# Patient Record
Sex: FEMALE | Race: WHITE | NOT HISPANIC OR LATINO | Employment: FULL TIME | URBAN - METROPOLITAN AREA
[De-identification: names, ages, dates, MRNs, and addresses within clinical notes are randomized per-mention and may not be internally consistent; named-entity substitution may affect disease eponyms.]

---

## 2023-05-30 ENCOUNTER — OFFICE VISIT (OUTPATIENT)
Dept: PRIMARY CARE | Facility: CLINIC | Age: 48
End: 2023-05-30
Payer: COMMERCIAL

## 2023-05-30 ENCOUNTER — LAB (OUTPATIENT)
Dept: LAB | Facility: LAB | Age: 48
End: 2023-05-30
Payer: COMMERCIAL

## 2023-05-30 VITALS
SYSTOLIC BLOOD PRESSURE: 122 MMHG | BODY MASS INDEX: 26.84 KG/M2 | WEIGHT: 171 LBS | HEIGHT: 67 IN | TEMPERATURE: 97.9 F | DIASTOLIC BLOOD PRESSURE: 72 MMHG

## 2023-05-30 DIAGNOSIS — Z00.00 HEALTHCARE MAINTENANCE: Primary | ICD-10-CM

## 2023-05-30 DIAGNOSIS — Z00.00 HEALTHCARE MAINTENANCE: ICD-10-CM

## 2023-05-30 DIAGNOSIS — Z13.29 SCREENING FOR THYROID DISORDER: ICD-10-CM

## 2023-05-30 DIAGNOSIS — Z13.21 ENCOUNTER FOR VITAMIN DEFICIENCY SCREENING: ICD-10-CM

## 2023-05-30 PROBLEM — R92.0 ABNORMAL MAMMOGRAM WITH MICROCALCIFICATION: Status: ACTIVE | Noted: 2018-07-24

## 2023-05-30 PROBLEM — N60.12 FIBROCYSTIC CHANGES OF LEFT BREAST: Status: ACTIVE | Noted: 2018-08-02

## 2023-05-30 PROBLEM — G89.29 CHRONIC HEADACHES: Status: ACTIVE | Noted: 2023-05-30

## 2023-05-30 PROBLEM — R51.9 CHRONIC HEADACHES: Status: ACTIVE | Noted: 2023-05-30

## 2023-05-30 LAB
ALANINE AMINOTRANSFERASE (SGPT) (U/L) IN SER/PLAS: 13 U/L (ref 7–45)
ALBUMIN (G/DL) IN SER/PLAS: 4.2 G/DL (ref 3.4–5)
ALKALINE PHOSPHATASE (U/L) IN SER/PLAS: 33 U/L (ref 33–110)
ANION GAP IN SER/PLAS: 8 MMOL/L (ref 10–20)
ASPARTATE AMINOTRANSFERASE (SGOT) (U/L) IN SER/PLAS: 17 U/L (ref 9–39)
BASOPHILS (10*3/UL) IN BLOOD BY AUTOMATED COUNT: 0.02 X10E9/L (ref 0–0.1)
BASOPHILS/100 LEUKOCYTES IN BLOOD BY AUTOMATED COUNT: 0.4 % (ref 0–2)
BILIRUBIN TOTAL (MG/DL) IN SER/PLAS: 0.4 MG/DL (ref 0–1.2)
CALCIDIOL (25 OH VITAMIN D3) (NG/ML) IN SER/PLAS: 42 NG/ML
CALCIUM (MG/DL) IN SER/PLAS: 9.4 MG/DL (ref 8.6–10.3)
CARBON DIOXIDE, TOTAL (MMOL/L) IN SER/PLAS: 32 MMOL/L (ref 21–32)
CHLORIDE (MMOL/L) IN SER/PLAS: 105 MMOL/L (ref 98–107)
CHOLESTEROL (MG/DL) IN SER/PLAS: 252 MG/DL (ref 0–199)
CHOLESTEROL IN HDL (MG/DL) IN SER/PLAS: 67.4 MG/DL
CHOLESTEROL/HDL RATIO: 3.7
CREATININE (MG/DL) IN SER/PLAS: 0.76 MG/DL (ref 0.5–1.05)
EOSINOPHILS (10*3/UL) IN BLOOD BY AUTOMATED COUNT: 0.07 X10E9/L (ref 0–0.7)
EOSINOPHILS/100 LEUKOCYTES IN BLOOD BY AUTOMATED COUNT: 1.3 % (ref 0–6)
ERYTHROCYTE DISTRIBUTION WIDTH (RATIO) BY AUTOMATED COUNT: 13.3 % (ref 11.5–14.5)
ERYTHROCYTE MEAN CORPUSCULAR HEMOGLOBIN CONCENTRATION (G/DL) BY AUTOMATED: 32.3 G/DL (ref 32–36)
ERYTHROCYTE MEAN CORPUSCULAR VOLUME (FL) BY AUTOMATED COUNT: 96 FL (ref 80–100)
ERYTHROCYTES (10*6/UL) IN BLOOD BY AUTOMATED COUNT: 4.27 X10E12/L (ref 4–5.2)
GFR FEMALE: >90 ML/MIN/1.73M2
GLUCOSE (MG/DL) IN SER/PLAS: 75 MG/DL (ref 74–99)
HEMATOCRIT (%) IN BLOOD BY AUTOMATED COUNT: 41.2 % (ref 36–46)
HEMOGLOBIN (G/DL) IN BLOOD: 13.3 G/DL (ref 12–16)
IMMATURE GRANULOCYTES/100 LEUKOCYTES IN BLOOD BY AUTOMATED COUNT: 0.2 % (ref 0–0.9)
LDL: 157 MG/DL (ref 0–99)
LEUKOCYTES (10*3/UL) IN BLOOD BY AUTOMATED COUNT: 5.4 X10E9/L (ref 4.4–11.3)
LYMPHOCYTES (10*3/UL) IN BLOOD BY AUTOMATED COUNT: 1.39 X10E9/L (ref 1.2–4.8)
LYMPHOCYTES/100 LEUKOCYTES IN BLOOD BY AUTOMATED COUNT: 25.8 % (ref 13–44)
MONOCYTES (10*3/UL) IN BLOOD BY AUTOMATED COUNT: 0.35 X10E9/L (ref 0.1–1)
MONOCYTES/100 LEUKOCYTES IN BLOOD BY AUTOMATED COUNT: 6.5 % (ref 2–10)
NEUTROPHILS (10*3/UL) IN BLOOD BY AUTOMATED COUNT: 3.55 X10E9/L (ref 1.2–7.7)
NEUTROPHILS/100 LEUKOCYTES IN BLOOD BY AUTOMATED COUNT: 65.8 % (ref 40–80)
PLATELETS (10*3/UL) IN BLOOD AUTOMATED COUNT: 186 X10E9/L (ref 150–450)
POTASSIUM (MMOL/L) IN SER/PLAS: 4.6 MMOL/L (ref 3.5–5.3)
PROTEIN TOTAL: 6.3 G/DL (ref 6.4–8.2)
SODIUM (MMOL/L) IN SER/PLAS: 140 MMOL/L (ref 136–145)
THYROTROPIN (MIU/L) IN SER/PLAS BY DETECTION LIMIT <= 0.05 MIU/L: 1.77 MIU/L (ref 0.44–3.98)
TRIGLYCERIDE (MG/DL) IN SER/PLAS: 138 MG/DL (ref 0–149)
UREA NITROGEN (MG/DL) IN SER/PLAS: 14 MG/DL (ref 6–23)
VLDL: 28 MG/DL (ref 0–40)

## 2023-05-30 PROCEDURE — 80061 LIPID PANEL: CPT

## 2023-05-30 PROCEDURE — 84443 ASSAY THYROID STIM HORMONE: CPT

## 2023-05-30 PROCEDURE — 36415 COLL VENOUS BLD VENIPUNCTURE: CPT

## 2023-05-30 PROCEDURE — 80053 COMPREHEN METABOLIC PANEL: CPT

## 2023-05-30 PROCEDURE — 99396 PREV VISIT EST AGE 40-64: CPT | Performed by: NURSE PRACTITIONER

## 2023-05-30 PROCEDURE — 1036F TOBACCO NON-USER: CPT | Performed by: NURSE PRACTITIONER

## 2023-05-30 PROCEDURE — 85025 COMPLETE CBC W/AUTO DIFF WBC: CPT

## 2023-05-30 PROCEDURE — 82306 VITAMIN D 25 HYDROXY: CPT

## 2023-05-30 RX ORDER — CHOLECALCIFEROL (VITAMIN D3) 25 MCG
1 TABLET ORAL DAILY
COMMUNITY
Start: 2022-05-13

## 2023-05-30 RX ORDER — MULTIVIT-MIN/IRON FUM/FOLIC AC 7.5 MG-4
1 TABLET ORAL DAILY
COMMUNITY

## 2023-05-30 RX ORDER — MULTIVIT-MIN/IRON/FOLIC ACID/K 18-600-40
CAPSULE ORAL
COMMUNITY

## 2023-05-30 RX ORDER — FLUTICASONE PROPIONATE 50 MCG
SPRAY, SUSPENSION (ML) NASAL
COMMUNITY
Start: 2016-01-20

## 2023-05-30 RX ORDER — OMEGA-3 FATTY ACIDS/FISH OIL 340-1000MG
CAPSULE ORAL
COMMUNITY
Start: 2022-05-13

## 2023-05-30 RX ORDER — ASCORBIC ACID/MULTIVIT-MIN 1000 MG
EFFERVESCENT POWDER IN PACKET ORAL
COMMUNITY

## 2023-05-30 RX ORDER — CETIRIZINE HYDROCHLORIDE 10 MG/1
1 TABLET ORAL DAILY
COMMUNITY

## 2023-05-30 NOTE — PATIENT INSTRUCTIONS
Good to see you today.  Your exam looks good.  Fasting labs and I will follow up via My Chart.  Let me know if you need anything.

## 2023-05-30 NOTE — PROGRESS NOTES
"Subjective   Patient ID: Erinn Bolanos is a 48 y.o. female who presents for Annual Exam (Patient has GYN-Hina Contreras).    HPI   GYN:  Ben  - Normal PAP 12/22  Just had Mammogram 4/23  Colonoscopy last November  Spouse had COVID but she did not.  Sees Dr. Lake for DERM  Only concern is more Acid Reflux in the last couple weeks.    Has also had allergy symptoms.    Better with Nexium.    Review of Systems   All other systems reviewed and are negative.      Objective   /72 (BP Location: Left arm, Patient Position: Sitting)   Temp 36.6 °C (97.9 °F) (Temporal)   Ht 1.702 m (5' 7\")   Wt 77.6 kg (171 lb)   BMI 26.78 kg/m²     Physical Exam  Vitals and nursing note reviewed.   Constitutional:       Appearance: Normal appearance.   HENT:      Head: Normocephalic and atraumatic.      Right Ear: Tympanic membrane, ear canal and external ear normal.      Left Ear: Tympanic membrane, ear canal and external ear normal.      Nose: Nose normal.      Mouth/Throat:      Comments: + PND  Neurological:      Mental Status: She is alert.         Assessment/Plan   Problem List Items Addressed This Visit       Encounter for vitamin deficiency screening - Primary    Relevant Orders    Vitamin D, Total (Completed)   Healthcare Maintenance - check labs   Screening for thyroid  Reviewed triggers for acid reflux  "

## 2024-07-18 ENCOUNTER — APPOINTMENT (OUTPATIENT)
Dept: PRIMARY CARE | Facility: CLINIC | Age: 49
End: 2024-07-18
Payer: COMMERCIAL

## 2024-07-18 ENCOUNTER — LAB (OUTPATIENT)
Dept: LAB | Facility: LAB | Age: 49
End: 2024-07-18
Payer: COMMERCIAL

## 2024-07-18 VITALS
DIASTOLIC BLOOD PRESSURE: 78 MMHG | HEIGHT: 68 IN | TEMPERATURE: 97.8 F | WEIGHT: 173 LBS | SYSTOLIC BLOOD PRESSURE: 120 MMHG | BODY MASS INDEX: 26.22 KG/M2

## 2024-07-18 DIAGNOSIS — Z13.1 SCREENING FOR DIABETES MELLITUS: ICD-10-CM

## 2024-07-18 DIAGNOSIS — Z13.21 ENCOUNTER FOR VITAMIN DEFICIENCY SCREENING: ICD-10-CM

## 2024-07-18 DIAGNOSIS — Z00.00 HEALTHCARE MAINTENANCE: ICD-10-CM

## 2024-07-18 DIAGNOSIS — Z13.29 THYROID DISORDER SCREENING: ICD-10-CM

## 2024-07-18 DIAGNOSIS — Z00.00 HEALTHCARE MAINTENANCE: Primary | ICD-10-CM

## 2024-07-18 DIAGNOSIS — R39.15 URINARY URGENCY: ICD-10-CM

## 2024-07-18 PROBLEM — R32 LACK OF BLADDER CONTROL: Status: ACTIVE | Noted: 2018-06-01

## 2024-07-18 LAB
25(OH)D3 SERPL-MCNC: 47 NG/ML (ref 30–100)
ALBUMIN SERPL BCP-MCNC: 4.6 G/DL (ref 3.4–5)
ALP SERPL-CCNC: 38 U/L (ref 33–110)
ALT SERPL W P-5'-P-CCNC: 11 U/L (ref 7–45)
ANION GAP SERPL CALC-SCNC: 11 MMOL/L (ref 10–20)
AST SERPL W P-5'-P-CCNC: 16 U/L (ref 9–39)
BILIRUB SERPL-MCNC: 0.5 MG/DL (ref 0–1.2)
BUN SERPL-MCNC: 18 MG/DL (ref 6–23)
CALCIUM SERPL-MCNC: 9.3 MG/DL (ref 8.6–10.3)
CHLORIDE SERPL-SCNC: 103 MMOL/L (ref 98–107)
CHOLEST SERPL-MCNC: 303 MG/DL (ref 0–199)
CHOLESTEROL/HDL RATIO: 3.3
CO2 SERPL-SCNC: 27 MMOL/L (ref 21–32)
CREAT SERPL-MCNC: 0.83 MG/DL (ref 0.5–1.05)
EGFRCR SERPLBLD CKD-EPI 2021: 87 ML/MIN/1.73M*2
ERYTHROCYTE [DISTWIDTH] IN BLOOD BY AUTOMATED COUNT: 14.3 % (ref 11.5–14.5)
GLUCOSE SERPL-MCNC: 92 MG/DL (ref 74–99)
HCT VFR BLD AUTO: 44.4 % (ref 36–46)
HDLC SERPL-MCNC: 90.9 MG/DL
HGB BLD-MCNC: 14.1 G/DL (ref 12–16)
LDLC SERPL CALC-MCNC: 198 MG/DL
MCH RBC QN AUTO: 30.1 PG (ref 26–34)
MCHC RBC AUTO-ENTMCNC: 31.8 G/DL (ref 32–36)
MCV RBC AUTO: 95 FL (ref 80–100)
NON HDL CHOLESTEROL: 212 MG/DL (ref 0–149)
NRBC BLD-RTO: 0 /100 WBCS (ref 0–0)
PLATELET # BLD AUTO: 223 X10*3/UL (ref 150–450)
POTASSIUM SERPL-SCNC: 5 MMOL/L (ref 3.5–5.3)
PROT SERPL-MCNC: 7.3 G/DL (ref 6.4–8.2)
RBC # BLD AUTO: 4.68 X10*6/UL (ref 4–5.2)
SODIUM SERPL-SCNC: 136 MMOL/L (ref 136–145)
TRIGL SERPL-MCNC: 73 MG/DL (ref 0–149)
TSH SERPL-ACNC: 2.39 MIU/L (ref 0.44–3.98)
VLDL: 15 MG/DL (ref 0–40)
WBC # BLD AUTO: 5.9 X10*3/UL (ref 4.4–11.3)

## 2024-07-18 PROCEDURE — 99396 PREV VISIT EST AGE 40-64: CPT | Performed by: NURSE PRACTITIONER

## 2024-07-18 PROCEDURE — 1036F TOBACCO NON-USER: CPT | Performed by: NURSE PRACTITIONER

## 2024-07-18 PROCEDURE — 85027 COMPLETE CBC AUTOMATED: CPT

## 2024-07-18 PROCEDURE — 81003 URINALYSIS AUTO W/O SCOPE: CPT

## 2024-07-18 PROCEDURE — 82306 VITAMIN D 25 HYDROXY: CPT

## 2024-07-18 PROCEDURE — 36415 COLL VENOUS BLD VENIPUNCTURE: CPT

## 2024-07-18 PROCEDURE — 3008F BODY MASS INDEX DOCD: CPT | Performed by: NURSE PRACTITIONER

## 2024-07-18 PROCEDURE — 80061 LIPID PANEL: CPT

## 2024-07-18 PROCEDURE — 80053 COMPREHEN METABOLIC PANEL: CPT

## 2024-07-18 PROCEDURE — 84443 ASSAY THYROID STIM HORMONE: CPT

## 2024-07-18 PROCEDURE — 83036 HEMOGLOBIN GLYCOSYLATED A1C: CPT

## 2024-07-18 ASSESSMENT — PROMIS GLOBAL HEALTH SCALE
RATE_SOCIAL_SATISFACTION: EXCELLENT
RATE_MENTAL_HEALTH: EXCELLENT
RATE_AVERAGE_FATIGUE: MILD
RATE_GENERAL_HEALTH: VERY GOOD
CARRYOUT_PHYSICAL_ACTIVITIES: COMPLETELY
EMOTIONAL_PROBLEMS: RARELY
RATE_AVERAGE_PAIN: 1
RATE_QUALITY_OF_LIFE: EXCELLENT
CARRYOUT_SOCIAL_ACTIVITIES: EXCELLENT
RATE_PHYSICAL_HEALTH: GOOD

## 2024-07-18 ASSESSMENT — PATIENT HEALTH QUESTIONNAIRE - PHQ9
2. FEELING DOWN, DEPRESSED OR HOPELESS: NOT AT ALL
SUM OF ALL RESPONSES TO PHQ9 QUESTIONS 1 AND 2: 0
1. LITTLE INTEREST OR PLEASURE IN DOING THINGS: NOT AT ALL

## 2024-07-18 NOTE — PATIENT INSTRUCTIONS
Good to see you today  Fasting labs and urinalysis today.  REFER to URO/GYN   you can schedule by [phone]  Keep up the good work taking care of yourself.

## 2024-07-18 NOTE — PROGRESS NOTES
"Subjective   Patient ID: Erinn Bolanos is a 49 y.o. female who presents for Annual Exam.    HPI   GYN  - Hina Contreras current  DERM - Frannychad DE SOUZA   Thinks that red wine & red sauce make it worse.  Only time she takes anything, when she is having something  2018  Has had some urinary concerns.  Seems to have to go a lot day and night.  Cannot seem to hold urine.  Regular with exercise.      Review of Systems   Genitourinary:  Positive for frequency and urgency. Negative for dysuria, enuresis and pelvic pain.       Objective   /78   Temp 36.6 °C (97.8 °F)   Ht 1.727 m (5' 8\")   Wt 78.5 kg (173 lb)   BMI 26.30 kg/m²     Physical Exam  Vitals and nursing note reviewed.   Constitutional:       Appearance: Normal appearance.   HENT:      Head: Normocephalic and atraumatic.      Right Ear: Tympanic membrane, ear canal and external ear normal.      Left Ear: Tympanic membrane, ear canal and external ear normal.      Nose: Nose normal.      Mouth/Throat:      Mouth: Mucous membranes are moist.      Pharynx: Oropharynx is clear.   Eyes:      Extraocular Movements: Extraocular movements intact.      Conjunctiva/sclera: Conjunctivae normal.      Pupils: Pupils are equal, round, and reactive to light.   Neck:      Thyroid: No thyroid mass, thyromegaly or thyroid tenderness.   Cardiovascular:      Rate and Rhythm: Normal rate and regular rhythm.      Pulses: Normal pulses.      Heart sounds: Normal heart sounds.   Pulmonary:      Effort: Pulmonary effort is normal.      Breath sounds: Normal breath sounds.   Abdominal:      General: Bowel sounds are normal.      Palpations: Abdomen is soft.   Genitourinary:     Comments: Deferred  Musculoskeletal:         General: Normal range of motion.      Cervical back: Normal range of motion and neck supple.   Skin:     General: Skin is warm.      Capillary Refill: Capillary refill takes 2 to 3 seconds.      Comments: Tan scattered nevi & freckles.   Neurological:      " Mental Status: She is alert and oriented to person, place, and time. Mental status is at baseline.   Psychiatric:         Mood and Affect: Mood normal.         Behavior: Behavior normal.         Thought Content: Thought content normal.         Judgment: Judgment normal.         Assessment/Plan   Problem List Items Addressed This Visit             ICD-10-CM    Healthcare maintenance - Primary Z00.00    Relevant Orders    CBC (Completed)    Comprehensive Metabolic Panel (Completed)    Lipid Panel (Completed)    Urinary urgency R39.15    Relevant Orders    Referral to Urogynecology    Urinalysis with Reflex Microscopic (Completed)

## 2024-07-19 LAB
APPEARANCE UR: ABNORMAL
BILIRUB UR STRIP.AUTO-MCNC: NEGATIVE MG/DL
COLOR UR: ABNORMAL
EST. AVERAGE GLUCOSE BLD GHB EST-MCNC: 111 MG/DL
GLUCOSE UR STRIP.AUTO-MCNC: NORMAL MG/DL
HBA1C MFR BLD: 5.5 %
KETONES UR STRIP.AUTO-MCNC: NEGATIVE MG/DL
LEUKOCYTE ESTERASE UR QL STRIP.AUTO: NEGATIVE
NITRITE UR QL STRIP.AUTO: NEGATIVE
PH UR STRIP.AUTO: 5.5 [PH]
PROT UR STRIP.AUTO-MCNC: NEGATIVE MG/DL
RBC # UR STRIP.AUTO: NEGATIVE /UL
SP GR UR STRIP.AUTO: 1.03
UROBILINOGEN UR STRIP.AUTO-MCNC: NORMAL MG/DL

## 2024-07-26 NOTE — PROGRESS NOTES
[facility]  Outpatient Clinic Note    Patient: Erinn Bolanos  Age/Sex: 49 y.o., female  MRN: [id number]  Referred by: Dr. Og     Chief Complaint:  Urinary urgency         History of Present Illness  This is a 49 y.o. female,  who presents as a new patient to the clinic for urinary urgency that has been going on for a few years. She has urinary urgency and frequency, she goes to the bathroom 3 time an hour. She gets up once a night to go the bathroom. She drinks a few cups of coffee a day and water. She admits to MARY but is more bothered by urge. She has not tried medication for any of these issues.  She denies dysuria, gross hematuria, flank pain, pelvic pain, vaginal bulging, fever or chills. The patient stated her bowel movements are regular and daily. She is sexually active, she admits to pain with thrusting. She denies vaginal dryness. She gets regular periods every month, she has heavy bleeding and cramping for the first few days.  She had 1 vaginal birth with tearing.  Denies abdominal/pelvic surgery. No history of breast cancer. She denies ever using tobacco.         Gyn History:  - Menopausal: No           Postmenopausal bleeding: No  - Hysterectomy: No  - Sexually active:  Yes  Dyspareunia: Yes   Other issues: none  - Number of prior vaginal deliveries: 1 with tearing  - Number of prior c-sections: 0      Past Medical & Surgical History  Past Medical History:   Diagnosis Date    Allergic     Eczema     Headache     Urinary tract infection      Past Surgical History:   Procedure Laterality Date    OTHER SURGICAL HISTORY  2022    Loop electrosurgical excision procedure    OTHER SURGICAL HISTORY Left 2022    Breast biopsy    SKIN BIOPSY      All benign       Family History  Family History   Problem Relation Name Age of Onset    No Known Problems Mother      Hyperlipidemia Father      No Known Problems Sister Larisa     No Known Problems Sister Blaire     No Known Problems Brother Marcin      No Known Problems Daughter Silvia        Social History  She reports that she has never smoked. She has never been exposed to tobacco smoke. She has never used smokeless tobacco. She reports current alcohol use of about 5.0 standard drinks of alcohol per week. She reports that she does not use drugs.    Allergies  Patient has no known allergies.    Medications:  Current Outpatient Medications on File Prior to Visit   Medication Sig Dispense Refill    ascorbic acid, vitamin C, 500 mg capsule 1 cap(s) orally once a day      ascorbic acid-multivit-min (Emergen-C) 1,000 mg powder effervescent in packet Emergen-C oral powder for reconstitution   Quantity: 0   Refills: 0   Ordered: 21-Jul-2022  Edelmira Gutiérrez Generic Substitution Allowed      cetirizine (ZyrTEC) 10 mg tablet Take 1 tablet (10 mg) by mouth once daily.      cholecalciferol (Vitamin D-3) 25 MCG (1000 UT) tablet Take 1 tablet (25 mcg) by mouth once daily.      fluticasone (Flonase) 50 mcg/actuation nasal spray 2 spray(s) nasal 2 times a day      multivitamin with minerals (multivit-min-iron fum-folic ac) tablet Take 1 tablet by mouth once daily.      omega-3 fatty acids-fish oil (Fish OiL) 340-1,000 mg capsule 1 cap(s) orally once a day       No current facility-administered medications on file prior to visit.      Vitals:    07/30/24 1018   BP: 125/78   Pulse: 80     Body mass index is 25.39 kg/m².    Review of Systems   A comprehensive 10+ review of systems was negative except for: see hpi          Physical Exam                                                                                                                      General: Well developed, well nourished, alert and cooperative, appears in no acute distress  Head: Normocephalic, atraumatic  Neck: supple, trachea midline  Eyes: Non-injected conjunctiva, sclera clear, no proptosis  Cardiac: Extremities are warm and well perfused. No edema, cyanosis or pallor.   Lungs: Breathing is easy,  non-labored. Speaking in clear and complete sentences. Normal diaphragmatic movement.  Abdomen: soft, non-distended, non-tender, no rebound or guarding, no hernia and no CVA tenderness   MSK: Ambulatory with steady gait, unassisted  Neuro: alert and oriented to person, place and time  Psych: Demonstrates good judgement and reason, without hallucinations, abnormal affect or abnormal behaviors.  Skin: no obvious lesions, no rashes  Pelvic:  Genitourinary:  normal external genitalia, Bartholin's glands negative, [place]'s glands negative  Urethra   normal meatus, non-tender, no periurethral mass  Vaginal mucosa  normal  Cervix normal  Uterus normal size, nontender  Adnexae  negative nontender, no masses  Atrophy negative    CST negative    POP-stage 3    Rectal: deferred    PVR (by Ultrasound): 192mL   Urine dip:   Recent Results (from the past 6 hour(s))   POCT UA Automated manually resulted    Collection Time: 07/30/24 10:33 AM   Result Value Ref Range    POC Color, Urine Yellow Straw, Yellow, Light-Yellow    POC Appearance, Urine Clear Clear    POC Glucose, Urine NEGATIVE NEGATIVE mg/dl    POC Bilirubin, Urine NEGATIVE NEGATIVE    POC Ketones, Urine NEGATIVE NEGATIVE mg/dl    POC Specific Gravity, Urine 1.010 1.005 - 1.035    POC Blood, Urine NEGATIVE NEGATIVE    POC PH, Urine 6.0 No Reference Range Established PH    POC Protein, Urine NEGATIVE NEGATIVE, 30 (1+) mg/dl    POC Urobilinogen, Urine 0.2 0.2, 1.0 EU/DL    Poc Nitrite, Urine NEGATIVE NEGATIVE    POC Leukocytes, Urine NEGATIVE NEGATIVE       Labs  N/A    Imaging  N/A      IMPRESSION AND PLAN:  Erinn Bolanos is a 49 y.o. presents with mixed urinary incontinence, overactive bladder and prolapse.      NEMESIO: Urge dominant  -discussed mechanism of UUI and MARY, and treatment options for both including PFT, pessary, sling for MARY and PFT, pharmacotherapy and third-line therapy for OAB  -Educational handouts given on treatment options    OAB/UUI  -Start Vesicare daily  in the morning  -we discussed botox vs sacral neuromodulation: both have similar efficacy 80% patients reports >50% improvement, botox associated with 5% risk of incomplete emptying, increase in UTI and will require re-injection in 6-9 months; and as early as 3 months. SNM is a staged procedure, 2 weeks apart, consisting first of lead implantation then internalization of IPG if there is improvement. Interstim is associated with lead migration, explantation, infection and bleeding, though risks are all <5%. We also discussed PTNS which is associated with success rates comparable to medical therapy but without side-effects without significant major morbidity.     POP:  -Fitted with a pessary ring #3 with support 2.50 in (Rs3)  -We discussed surgical options, patient would like to possibly discuss at next visit  -Educational handout given on vaginal prolapse, and pessary education    Pessary   -A pessary is a round device inserted into the vagina to support fallen or prolapsed uterus, bladder or rectum. ?It may ease your feelings of vaginal pressure or bulging. It can also be used to alleviate urinary incontinence by repositioning the problem area back to its original position. It can be a relatively simple solution to an uncomfortable problem.   -There are many different types of pessaries and each is made in many sizes. ?We have worked with you today to find the correct pessary for you. ?It is not uncommon that fitting will take a few tries.      What is it made of?   -Pessaries are made of soft, pliable medical grade silicon. This ensures a longer usage life and less chance of an allergic reaction.      How is it inserted?   -A pessary is inserted with a small amount of lubricant much like a diaphragm. ?A properly fitted pessary should not be felt once it is place.      How do I take care of it?    -You can be taught how to remove your pessary if you wish and are physically able to do so. ?If not, you can come to  the clinic every three months and we will remove it for you, clean it and reinsert it.   -A pessary can be removed as often as you wish or at least once every two weeks. ?Most women will notice increased vaginal discharge when leaving it in the vagina for a prolonged period of time. ?This is a normal response and unless the discharge becomes irritating or foul smelling, it is not a sign of infection. ?You may also notice the pessary will change color over time and this also is normal.    -Tell your physician if you are sexually active and your provider will fit you with the appropriate type if you choose to leave the pessary in the vagina during intercourse. ?   -At home, when you remove your pessary, it should be washed with mild soap and water and stored to be kept clean until you reinsert it. ?Keep pessary out overnight and replace in the morning.??The vagina is not sterile, so soap and water are appropriate for cleaning. ?Alcohol, bleach, peroxide, or other harsh chemicals should not be used as they can irritate the vagina and cause changes in the pessary rubber. ?     Will it fall out?   -The pessary may fall out with straining or heavy lifting. ?If this happens, notify your physician. ?It may be that your pessary is too small or you need have a change in size if it has been some time. You can also support the pessary vaginally during a bowel movement to prevent it from coming out. If your pessary falls out, wash it with soap and water before reinserting. ?       Patient follow up:   -Report inability to urinate or have bowel movement.   -Report any pain in urination or bowel movements.   -Report any signs of vaginal bleeding.   -Report and discharge with strong odor.   -Report any discomfort or pain while pessary is in place     Follow-up with me in 3 months or sooner if needed    All questions and concerns were answered and addressed.  The patient expressed understanding and agrees with the plan.     Reviewed  and approved by FRANCE MORRISSEY on 7/30/24 at 11:40 AM.

## 2024-07-30 ENCOUNTER — APPOINTMENT (OUTPATIENT)
Dept: UROLOGY | Facility: CLINIC | Age: 49
End: 2024-07-30
Payer: COMMERCIAL

## 2024-07-30 VITALS
SYSTOLIC BLOOD PRESSURE: 125 MMHG | DIASTOLIC BLOOD PRESSURE: 78 MMHG | WEIGHT: 167 LBS | BODY MASS INDEX: 25.31 KG/M2 | HEIGHT: 68 IN | HEART RATE: 80 BPM

## 2024-07-30 DIAGNOSIS — N81.10 VAGINAL PROLAPSE: ICD-10-CM

## 2024-07-30 DIAGNOSIS — N32.81 OAB (OVERACTIVE BLADDER): ICD-10-CM

## 2024-07-30 DIAGNOSIS — N39.46 MIXED STRESS AND URGE URINARY INCONTINENCE: Primary | ICD-10-CM

## 2024-07-30 LAB
POC APPEARANCE, URINE: CLEAR
POC BILIRUBIN, URINE: NEGATIVE
POC BLOOD, URINE: NEGATIVE
POC COLOR, URINE: YELLOW
POC GLUCOSE, URINE: NEGATIVE MG/DL
POC KETONES, URINE: NEGATIVE MG/DL
POC LEUKOCYTES, URINE: NEGATIVE
POC NITRITE,URINE: NEGATIVE
POC PH, URINE: 6 PH
POC PROTEIN, URINE: NEGATIVE MG/DL
POC SPECIFIC GRAVITY, URINE: 1.01
POC UROBILINOGEN, URINE: 0.2 EU/DL

## 2024-07-30 PROCEDURE — 2000F BLOOD PRESSURE MEASURE: CPT

## 2024-07-30 PROCEDURE — 99204 OFFICE O/P NEW MOD 45 MIN: CPT

## 2024-07-30 PROCEDURE — G2211 COMPLEX E/M VISIT ADD ON: HCPCS

## 2024-07-30 PROCEDURE — 1036F TOBACCO NON-USER: CPT

## 2024-07-30 PROCEDURE — 57160 INSERT PESSARY/OTHER DEVICE: CPT

## 2024-07-30 PROCEDURE — 3008F BODY MASS INDEX DOCD: CPT

## 2024-07-30 PROCEDURE — 51798 US URINE CAPACITY MEASURE: CPT

## 2024-07-30 PROCEDURE — 81003 URINALYSIS AUTO W/O SCOPE: CPT

## 2024-07-30 RX ORDER — SOLIFENACIN SUCCINATE 5 MG/1
5 TABLET, FILM COATED ORAL DAILY
Qty: 30 TABLET | Refills: 2 | Status: SHIPPED | OUTPATIENT
Start: 2024-07-30 | End: 2024-10-28

## 2024-07-30 ASSESSMENT — PAIN SCALES - GENERAL: PAINLEVEL: 0-NO PAIN

## 2024-07-30 NOTE — LETTER
2024     PARKER Henry  [address]  [facility], [address]  [facility] [address]    Patient: Erinn Bolanos   YOB: 1975   Date of Visit: 2024       Dear PARKER Henry:    Thank you for referring Erinn Bolanos to me for evaluation. Below are my notes for this consultation.  If you have questions, please do not hesitate to call me. I look forward to following your patient along with you.       Sincerely,     Ani Daly PA-C      CC: No Recipients  ______________________________________________________________________________________      [facility]  Outpatient Clinic Note    Patient: Erinn Bolanos  Age/Sex: 49 y.o., female  MRN: [id number]  Referred by: Dr. Og     Chief Complaint:  Urinary urgency         History of Present Illness  This is a 49 y.o. female,  who presents as a new patient to the clinic for urinary urgency that has been going on for a few years. She has urinary urgency and frequency, she goes to the bathroom 3 time an hour. She gets up once a night to go the bathroom. She drinks a few cups of coffee a day and water. She admits to MARY but is more bothered by urge. She has not tried medication for any of these issues.  She denies dysuria, gross hematuria, flank pain, pelvic pain, vaginal bulging, fever or chills. The patient stated her bowel movements are regular and daily. She is sexually active, she admits to pain with thrusting. She denies vaginal dryness. She gets regular periods every month, she has heavy bleeding and cramping for the first few days.  She had 1 vaginal birth with tearing.  Denies abdominal/pelvic surgery. No history of breast cancer. She denies ever using tobacco.         Gyn History:  - Menopausal: No           Postmenopausal bleeding: No  - Hysterectomy: No  - Sexually active:  Yes  Dyspareunia: Yes   Other issues: none  - Number of prior vaginal deliveries: 1 with tearing  - Number of prior c-sections:  0      Past Medical & Surgical History  Past Medical History:   Diagnosis Date   • Allergic    • Eczema    • Headache    • Urinary tract infection      Past Surgical History:   Procedure Laterality Date   • OTHER SURGICAL HISTORY  06/20/2022    Loop electrosurgical excision procedure   • OTHER SURGICAL HISTORY Left 06/20/2022    Breast biopsy   • SKIN BIOPSY      All benign       Family History  Family History   Problem Relation Name Age of Onset   • No Known Problems Mother     • Hyperlipidemia Father     • No Known Problems Sister Larisa    • No Known Problems Sister Blaire    • No Known Problems Brother Marcin    • No Known Problems Daughter Silvia        Social History  She reports that she has never smoked. She has never been exposed to tobacco smoke. She has never used smokeless tobacco. She reports current alcohol use of about 5.0 standard drinks of alcohol per week. She reports that she does not use drugs.    Allergies  Patient has no known allergies.    Medications:  Current Outpatient Medications on File Prior to Visit   Medication Sig Dispense Refill   • ascorbic acid, vitamin C, 500 mg capsule 1 cap(s) orally once a day     • ascorbic acid-multivit-min (Emergen-C) 1,000 mg powder effervescent in packet Emergen-C oral powder for reconstitution   Quantity: 0   Refills: 0   Ordered: 21-Jul-2022  Edelmira Gutiérrez Generic Substitution Allowed     • cetirizine (ZyrTEC) 10 mg tablet Take 1 tablet (10 mg) by mouth once daily.     • cholecalciferol (Vitamin D-3) 25 MCG (1000 UT) tablet Take 1 tablet (25 mcg) by mouth once daily.     • fluticasone (Flonase) 50 mcg/actuation nasal spray 2 spray(s) nasal 2 times a day     • multivitamin with minerals (multivit-min-iron fum-folic ac) tablet Take 1 tablet by mouth once daily.     • omega-3 fatty acids-fish oil (Fish OiL) 340-1,000 mg capsule 1 cap(s) orally once a day       No current facility-administered medications on file prior to visit.      Vitals:    07/30/24 1018   BP:  125/78   Pulse: 80     Body mass index is 25.39 kg/m².    Review of Systems   A comprehensive 10+ review of systems was negative except for: see hpi          Physical Exam                                                                                                                      General: Well developed, well nourished, alert and cooperative, appears in no acute distress  Head: Normocephalic, atraumatic  Neck: supple, trachea midline  Eyes: Non-injected conjunctiva, sclera clear, no proptosis  Cardiac: Extremities are warm and well perfused. No edema, cyanosis or pallor.   Lungs: Breathing is easy, non-labored. Speaking in clear and complete sentences. Normal diaphragmatic movement.  Abdomen: soft, non-distended, non-tender, no rebound or guarding, no hernia and no CVA tenderness   MSK: Ambulatory with steady gait, unassisted  Neuro: alert and oriented to person, place and time  Psych: Demonstrates good judgement and reason, without hallucinations, abnormal affect or abnormal behaviors.  Skin: no obvious lesions, no rashes  Pelvic:  Genitourinary:  normal external genitalia, Bartholin's glands negative, Convent's glands negative  Urethra   normal meatus, non-tender, no periurethral mass  Vaginal mucosa  normal  Cervix normal  Uterus normal size, nontender  Adnexae  negative nontender, no masses  Atrophy negative    CST negative    POP-stage 3    Rectal: deferred    PVR (by Ultrasound): 192mL   Urine dip:   Recent Results (from the past 6 hour(s))   POCT UA Automated manually resulted    Collection Time: 07/30/24 10:33 AM   Result Value Ref Range    POC Color, Urine Yellow Straw, Yellow, Light-Yellow    POC Appearance, Urine Clear Clear    POC Glucose, Urine NEGATIVE NEGATIVE mg/dl    POC Bilirubin, Urine NEGATIVE NEGATIVE    POC Ketones, Urine NEGATIVE NEGATIVE mg/dl    POC Specific Gravity, Urine 1.010 1.005 - 1.035    POC Blood, Urine NEGATIVE NEGATIVE    POC PH, Urine 6.0 No Reference Range Established PH     POC Protein, Urine NEGATIVE NEGATIVE, 30 (1+) mg/dl    POC Urobilinogen, Urine 0.2 0.2, 1.0 EU/DL    Poc Nitrite, Urine NEGATIVE NEGATIVE    POC Leukocytes, Urine NEGATIVE NEGATIVE       Labs  N/A    Imaging  N/A      IMPRESSION AND PLAN:  Erinn Bolanos is a 49 y.o. presents with mixed urinary incontinence, overactive bladder and prolapse.      NEMESIO: Urge dominant  -discussed mechanism of UUI and MARY, and treatment options for both including PFT, pessary, sling for MARY and PFT, pharmacotherapy and third-line therapy for OAB  -Educational handouts given on treatment options    OAB/UUI  -Start Vesicare daily in the morning  -we discussed botox vs sacral neuromodulation: both have similar efficacy 80% patients reports >50% improvement, botox associated with 5% risk of incomplete emptying, increase in UTI and will require re-injection in 6-9 months; and as early as 3 months. SNM is a staged procedure, 2 weeks apart, consisting first of lead implantation then internalization of IPG if there is improvement. Interstim is associated with lead migration, explantation, infection and bleeding, though risks are all <5%. We also discussed PTNS which is associated with success rates comparable to medical therapy but without side-effects without significant major morbidity.     POP:  -Fitted with a pessary ring #3 with support 2.50 in (Rs3)  -We discussed surgical options, patient would like to possibly discuss at next visit  -Educational handout given on vaginal prolapse, and pessary education    Pessary   -A pessary is a round device inserted into the vagina to support fallen or prolapsed uterus, bladder or rectum. ?It may ease your feelings of vaginal pressure or bulging. It can also be used to alleviate urinary incontinence by repositioning the problem area back to its original position. It can be a relatively simple solution to an uncomfortable problem.   -There are many different types of pessaries and each is made in many  sizes. ?We have worked with you today to find the correct pessary for you. ?It is not uncommon that fitting will take a few tries.      What is it made of?   -Pessaries are made of soft, pliable medical grade silicon. This ensures a longer usage life and less chance of an allergic reaction.      How is it inserted?   -A pessary is inserted with a small amount of lubricant much like a diaphragm. ?A properly fitted pessary should not be felt once it is place.      How do I take care of it?    -You can be taught how to remove your pessary if you wish and are physically able to do so. ?If not, you can come to the clinic every three months and we will remove it for you, clean it and reinsert it.   -A pessary can be removed as often as you wish or at least once every two weeks. ?Most women will notice increased vaginal discharge when leaving it in the vagina for a prolonged period of time. ?This is a normal response and unless the discharge becomes irritating or foul smelling, it is not a sign of infection. ?You may also notice the pessary will change color over time and this also is normal.    -Tell your physician if you are sexually active and your provider will fit you with the appropriate type if you choose to leave the pessary in the vagina during intercourse. ?   -At home, when you remove your pessary, it should be washed with mild soap and water and stored to be kept clean until you reinsert it. ?Keep pessary out overnight and replace in the morning.??The vagina is not sterile, so soap and water are appropriate for cleaning. ?Alcohol, bleach, peroxide, or other harsh chemicals should not be used as they can irritate the vagina and cause changes in the pessary rubber. ?     Will it fall out?   -The pessary may fall out with straining or heavy lifting. ?If this happens, notify your physician. ?It may be that your pessary is too small or you need have a change in size if it has been some time. You can also support the  pessary vaginally during a bowel movement to prevent it from coming out. If your pessary falls out, wash it with soap and water before reinserting. ?       Patient follow up:   -Report inability to urinate or have bowel movement.   -Report any pain in urination or bowel movements.   -Report any signs of vaginal bleeding.   -Report and discharge with strong odor.   -Report any discomfort or pain while pessary is in place     Follow-up with me in 3 months or sooner if needed    All questions and concerns were answered and addressed.  The patient expressed understanding and agrees with the plan.     Reviewed and approved by FRANCE MORRISSEY on 7/30/24 at 11:40 AM.

## 2024-07-31 PROBLEM — R39.15 URINARY URGENCY: Status: ACTIVE | Noted: 2024-07-31

## 2024-07-31 PROBLEM — Z13.1 SCREENING FOR DIABETES MELLITUS: Status: ACTIVE | Noted: 2023-05-30

## 2024-07-31 ASSESSMENT — ENCOUNTER SYMPTOMS
FREQUENCY: 1
DYSURIA: 0

## 2024-08-22 DIAGNOSIS — N39.46 MIXED STRESS AND URGE URINARY INCONTINENCE: ICD-10-CM

## 2024-08-22 RX ORDER — SOLIFENACIN SUCCINATE 5 MG/1
5 TABLET, FILM COATED ORAL DAILY
Qty: 90 TABLET | Refills: 0 | Status: SHIPPED | OUTPATIENT
Start: 2024-08-22 | End: 2024-11-20

## 2024-10-29 ENCOUNTER — APPOINTMENT (OUTPATIENT)
Dept: UROLOGY | Facility: CLINIC | Age: 49
End: 2024-10-29
Payer: COMMERCIAL

## 2024-10-29 DIAGNOSIS — N39.46 MIXED STRESS AND URGE URINARY INCONTINENCE: Primary | ICD-10-CM

## 2024-10-29 DIAGNOSIS — N32.81 OAB (OVERACTIVE BLADDER): ICD-10-CM

## 2024-10-29 DIAGNOSIS — N81.10 VAGINAL PROLAPSE: ICD-10-CM

## 2024-10-29 PROCEDURE — 1036F TOBACCO NON-USER: CPT

## 2024-10-29 PROCEDURE — 99214 OFFICE O/P EST MOD 30 MIN: CPT

## 2024-10-29 RX ORDER — MIRABEGRON 50 MG/1
50 TABLET, FILM COATED, EXTENDED RELEASE ORAL DAILY
Qty: 30 TABLET | Refills: 11 | Status: SHIPPED | OUTPATIENT
Start: 2024-10-29 | End: 2025-10-29

## 2025-01-29 ENCOUNTER — APPOINTMENT (OUTPATIENT)
Dept: UROLOGY | Facility: CLINIC | Age: 50
End: 2025-01-29
Payer: COMMERCIAL

## 2025-06-13 ENCOUNTER — APPOINTMENT (OUTPATIENT)
Dept: PRIMARY CARE | Facility: CLINIC | Age: 50
End: 2025-06-13
Payer: COMMERCIAL

## 2025-06-13 VITALS
BODY MASS INDEX: 23.79 KG/M2 | SYSTOLIC BLOOD PRESSURE: 100 MMHG | HEIGHT: 68 IN | DIASTOLIC BLOOD PRESSURE: 80 MMHG | TEMPERATURE: 97.7 F | WEIGHT: 157 LBS

## 2025-06-13 DIAGNOSIS — Z00.00 ROUTINE GENERAL MEDICAL EXAMINATION AT A HEALTH CARE FACILITY: Primary | ICD-10-CM

## 2025-06-13 DIAGNOSIS — Z13.1 SCREENING FOR DIABETES MELLITUS: ICD-10-CM

## 2025-06-13 DIAGNOSIS — Z13.228 SCREENING FOR METABOLIC DISORDER: ICD-10-CM

## 2025-06-13 DIAGNOSIS — E55.9 VITAMIN D DEFICIENCY: ICD-10-CM

## 2025-06-13 DIAGNOSIS — R39.15 URINARY URGENCY: ICD-10-CM

## 2025-06-13 DIAGNOSIS — Z13.6 SCREENING FOR HEART DISEASE: ICD-10-CM

## 2025-06-13 PROBLEM — J01.90 ACUTE BACTERIAL SINUSITIS: Status: ACTIVE | Noted: 2025-06-13

## 2025-06-13 PROBLEM — M25.579 ACUTE ANKLE PAIN: Status: ACTIVE | Noted: 2025-06-13

## 2025-06-13 PROBLEM — R53.83 MALAISE AND FATIGUE: Status: ACTIVE | Noted: 2025-06-13

## 2025-06-13 PROBLEM — R53.81 MALAISE AND FATIGUE: Status: ACTIVE | Noted: 2025-06-13

## 2025-06-13 PROBLEM — B96.89 ACUTE BACTERIAL SINUSITIS: Status: ACTIVE | Noted: 2025-06-13

## 2025-06-13 PROCEDURE — 99396 PREV VISIT EST AGE 40-64: CPT | Performed by: NURSE PRACTITIONER

## 2025-06-13 PROCEDURE — 3008F BODY MASS INDEX DOCD: CPT | Performed by: NURSE PRACTITIONER

## 2025-06-13 RX ORDER — MIRABEGRON 25 MG/1
25 TABLET, FILM COATED, EXTENDED RELEASE ORAL DAILY
Qty: 90 TABLET | Refills: 3 | Status: SHIPPED | OUTPATIENT
Start: 2025-06-13

## 2025-06-13 ASSESSMENT — PROMIS GLOBAL HEALTH SCALE
RATE_PHYSICAL_HEALTH: VERY GOOD
EMOTIONAL_PROBLEMS: NEVER
RATE_GENERAL_HEALTH: VERY GOOD
RATE_SOCIAL_SATISFACTION: EXCELLENT
CARRYOUT_PHYSICAL_ACTIVITIES: COMPLETELY
CARRYOUT_SOCIAL_ACTIVITIES: EXCELLENT
RATE_MENTAL_HEALTH: EXCELLENT
RATE_QUALITY_OF_LIFE: VERY GOOD
RATE_AVERAGE_PAIN: 1

## 2025-06-13 ASSESSMENT — PATIENT HEALTH QUESTIONNAIRE - PHQ9
SUM OF ALL RESPONSES TO PHQ9 QUESTIONS 1 AND 2: 0
1. LITTLE INTEREST OR PLEASURE IN DOING THINGS: NOT AT ALL
2. FEELING DOWN, DEPRESSED OR HOPELESS: NOT AT ALL

## 2025-06-13 NOTE — PROGRESS NOTES
Subjective   Patient ID: Erinn Bolanos is a 50 y.o. female who presents for Annual Exam (CPX).  HPI  Saw URO/GYN  - Ani Daly  Horrible constipation and bloating with Vesicare - switched Rx and Trial of Myrbetriq  in October January follow up Manny Wilson    Really started changing diet    Steel cut oats and cut out candy   Eating more protein  Walking on treadmill at least 3 days per week   Home weight - 152 #   Feeling good   Hoping that lipids have improved.  MAMMOGRAM - Ben 9/19/24 with follow up U/S  PAP 8/24 with Cervical Bx  UTD  Dental   Due for vision  Big family reunion this summer at the [place].    Review of Systems   Constitutional:  Positive for activity change and appetite change.   Respiratory:  Negative for shortness of breath.    Cardiovascular:  Negative for chest pain, palpitations and leg swelling.   Gastrointestinal:  Negative for abdominal pain.   Endocrine: Negative for cold intolerance, heat intolerance, polydipsia, polyphagia and polyuria.   Genitourinary:  Positive for frequency.       Objective   Physical Exam  Vitals and nursing note reviewed.   Constitutional:       Appearance: Normal appearance.   HENT:      Head: Normocephalic and atraumatic.      Right Ear: Tympanic membrane, ear canal and external ear normal.      Left Ear: Tympanic membrane, ear canal and external ear normal.      Nose: Nose normal.      Mouth/Throat:      Mouth: Mucous membranes are moist.      Pharynx: Oropharynx is clear.   Eyes:      Extraocular Movements: Extraocular movements intact.      Conjunctiva/sclera: Conjunctivae normal.      Pupils: Pupils are equal, round, and reactive to light.   Neck:      Thyroid: No thyroid mass, thyromegaly or thyroid tenderness.   Cardiovascular:      Rate and Rhythm: Normal rate and regular rhythm.      Pulses: Normal pulses.      Heart sounds: Normal heart sounds.   Pulmonary:      Effort: Pulmonary effort is normal.      Breath sounds: Normal breath sounds.    Abdominal:      General: Bowel sounds are normal.      Palpations: Abdomen is soft.   Genitourinary:     Comments: Deferred  Musculoskeletal:         General: Normal range of motion.      Cervical back: Normal range of motion and neck supple.   Skin:     General: Skin is warm.      Capillary Refill: Capillary refill takes 2 to 3 seconds.   Neurological:      Mental Status: She is alert and oriented to person, place, and time. Mental status is at baseline.   Psychiatric:         Mood and Affect: Mood normal.         Behavior: Behavior normal.         Thought Content: Thought content normal.         Judgment: Judgment normal.         Assessment & Plan  Routine general medical examination at a health care facility         Urinary urgency    Orders:    mirabegron (Myrbetriq) 25 mg tablet extended release 24 hr; Take 1 tablet (25 mg) by mouth once daily. Take with water. Do not chew, crush, or divide.    Vitamin D deficiency    Orders:    Vitamin D 25-Hydroxy,Total (for eval of Vitamin D levels); Future    Screening for metabolic disorder    Orders:    CBC; Future    Comprehensive Metabolic Panel; Future    TSH with reflex to Free T4 if abnormal; Future    Screening for heart disease    Orders:    Lipid Panel; Future    Screening for diabetes mellitus    Orders:    Hemoglobin A1C; Future              BRENDON Henry-CNP 06/13/25 2:05 PM

## 2025-06-13 NOTE — ASSESSMENT & PLAN NOTE
Orders:    mirabegron (Myrbetriq) 25 mg tablet extended release 24 hr; Take 1 tablet (25 mg) by mouth once daily. Take with water. Do not chew, crush, or divide.

## 2025-06-14 LAB
25(OH)D3+25(OH)D2 SERPL-MCNC: 36 NG/ML (ref 30–100)
ALBUMIN SERPL-MCNC: 4.8 G/DL (ref 3.6–5.1)
ALP SERPL-CCNC: 40 U/L (ref 31–125)
ALT SERPL-CCNC: 21 U/L (ref 6–29)
ANION GAP SERPL CALCULATED.4IONS-SCNC: 12 MMOL/L (CALC) (ref 7–17)
AST SERPL-CCNC: 20 U/L (ref 10–35)
BILIRUB SERPL-MCNC: 0.9 MG/DL (ref 0.2–1.2)
BUN SERPL-MCNC: 12 MG/DL (ref 7–25)
CALCIUM SERPL-MCNC: 9.6 MG/DL (ref 8.6–10.2)
CHLORIDE SERPL-SCNC: 101 MMOL/L (ref 98–110)
CHOLEST SERPL-MCNC: 239 MG/DL
CHOLEST/HDLC SERPL: 2.9 (CALC)
CO2 SERPL-SCNC: 24 MMOL/L (ref 20–32)
CREAT SERPL-MCNC: 0.77 MG/DL (ref 0.5–0.99)
EGFRCR SERPLBLD CKD-EPI 2021: 95 ML/MIN/1.73M2
ERYTHROCYTE [DISTWIDTH] IN BLOOD BY AUTOMATED COUNT: 13 % (ref 11–15)
EST. AVERAGE GLUCOSE BLD GHB EST-MCNC: 103 MG/DL
EST. AVERAGE GLUCOSE BLD GHB EST-SCNC: 5.7 MMOL/L
GLUCOSE SERPL-MCNC: 73 MG/DL (ref 65–99)
HBA1C MFR BLD: 5.2 %
HCT VFR BLD AUTO: 44.1 % (ref 35–45)
HDLC SERPL-MCNC: 82 MG/DL
HGB BLD-MCNC: 14.1 G/DL (ref 11.7–15.5)
LDLC SERPL CALC-MCNC: 139 MG/DL (CALC)
MCH RBC QN AUTO: 30.7 PG (ref 27–33)
MCHC RBC AUTO-ENTMCNC: 32 G/DL (ref 32–36)
MCV RBC AUTO: 96.1 FL (ref 80–100)
NONHDLC SERPL-MCNC: 157 MG/DL (CALC)
PLATELET # BLD AUTO: 178 THOUSAND/UL (ref 140–400)
PMV BLD REES-ECKER: 12.3 FL (ref 7.5–12.5)
POTASSIUM SERPL-SCNC: 4.2 MMOL/L (ref 3.5–5.3)
PROT SERPL-MCNC: 7.3 G/DL (ref 6.1–8.1)
RBC # BLD AUTO: 4.59 MILLION/UL (ref 3.8–5.1)
SODIUM SERPL-SCNC: 137 MMOL/L (ref 135–146)
TRIGL SERPL-MCNC: 81 MG/DL
TSH SERPL-ACNC: 1.3 MIU/L
WBC # BLD AUTO: 6.8 THOUSAND/UL (ref 3.8–10.8)

## 2025-07-06 PROBLEM — E55.9 VITAMIN D DEFICIENCY: Status: ACTIVE | Noted: 2025-07-06

## 2025-07-06 ASSESSMENT — ENCOUNTER SYMPTOMS
POLYPHAGIA: 0
POLYDIPSIA: 0
SHORTNESS OF BREATH: 0
ACTIVITY CHANGE: 1
ABDOMINAL PAIN: 0
APPETITE CHANGE: 1
FREQUENCY: 1
PALPITATIONS: 0

## 2025-07-18 ENCOUNTER — APPOINTMENT (OUTPATIENT)
Dept: PRIMARY CARE | Facility: CLINIC | Age: 50
End: 2025-07-18
Payer: COMMERCIAL

## 2025-08-13 ENCOUNTER — APPOINTMENT (OUTPATIENT)
Dept: PRIMARY CARE | Facility: CLINIC | Age: 50
End: 2025-08-13
Payer: COMMERCIAL